# Patient Record
Sex: FEMALE | Race: WHITE | Employment: UNEMPLOYED | URBAN - NONMETROPOLITAN AREA
[De-identification: names, ages, dates, MRNs, and addresses within clinical notes are randomized per-mention and may not be internally consistent; named-entity substitution may affect disease eponyms.]

---

## 2019-02-14 ENCOUNTER — HOSPITAL ENCOUNTER (OUTPATIENT)
Age: 54
Discharge: HOME | End: 2019-02-14
Payer: MEDICAID

## 2019-02-14 LAB — INR BLD: 0.91 (ref 0.85–1.13)

## 2019-02-14 PROCEDURE — 36415 COLL VENOUS BLD VENIPUNCTURE: CPT

## 2019-02-14 PROCEDURE — 85610 PROTHROMBIN TIME: CPT

## 2019-04-02 ENCOUNTER — HOSPITAL ENCOUNTER (OUTPATIENT)
Dept: MRI IMAGING | Age: 54
Discharge: HOME | End: 2019-04-02
Payer: MEDICAID

## 2019-04-02 ENCOUNTER — HOSPITAL ENCOUNTER (OUTPATIENT)
Dept: GENERAL RADIOLOGY | Age: 54
Discharge: HOME | End: 2019-04-02
Payer: MEDICAID

## 2019-04-02 DIAGNOSIS — Z00.6 EXAMINATION FOR NORMAL COMPARISON FOR CLINICAL RESEARCH: ICD-10-CM

## 2019-04-02 PROCEDURE — 3209999900 MRI COMPARISON OF OUTSIDE FILMS

## 2019-04-02 PROCEDURE — 3209999900 XR COMPARISON OF OUTSIDE FILMS

## 2019-04-03 ENCOUNTER — OFFICE VISIT (OUTPATIENT)
Dept: NEUROSURGERY | Age: 54
End: 2019-04-03
Payer: MEDICAID

## 2019-04-03 ENCOUNTER — HOSPITAL ENCOUNTER (OUTPATIENT)
Dept: MRI IMAGING | Age: 54
Discharge: HOME | End: 2019-04-03
Payer: MEDICAID

## 2019-04-03 VITALS
DIASTOLIC BLOOD PRESSURE: 79 MMHG | BODY MASS INDEX: 22.88 KG/M2 | WEIGHT: 134 LBS | HEIGHT: 64 IN | HEART RATE: 66 BPM | SYSTOLIC BLOOD PRESSURE: 121 MMHG

## 2019-04-03 DIAGNOSIS — H53.9 VISION DISORDER: ICD-10-CM

## 2019-04-03 DIAGNOSIS — Z00.6 EXAMINATION FOR NORMAL COMPARISON FOR CLINICAL RESEARCH: ICD-10-CM

## 2019-04-03 DIAGNOSIS — R20.0 NUMBNESS: ICD-10-CM

## 2019-04-03 DIAGNOSIS — M47.812 SPONDYLOSIS OF CERVICAL REGION WITHOUT MYELOPATHY OR RADICULOPATHY: Primary | ICD-10-CM

## 2019-04-03 DIAGNOSIS — G35 MULTIPLE SCLEROSIS (HCC): ICD-10-CM

## 2019-04-03 PROCEDURE — G8427 DOCREV CUR MEDS BY ELIG CLIN: HCPCS | Performed by: NEUROLOGICAL SURGERY

## 2019-04-03 PROCEDURE — 3209999900 MRI COMPARISON OF OUTSIDE FILMS

## 2019-04-03 PROCEDURE — G8420 CALC BMI NORM PARAMETERS: HCPCS | Performed by: NEUROLOGICAL SURGERY

## 2019-04-03 PROCEDURE — 99203 OFFICE O/P NEW LOW 30 MIN: CPT | Performed by: NEUROLOGICAL SURGERY

## 2019-04-03 PROCEDURE — 4004F PT TOBACCO SCREEN RCVD TLK: CPT | Performed by: NEUROLOGICAL SURGERY

## 2019-04-03 PROCEDURE — 3017F COLORECTAL CA SCREEN DOC REV: CPT | Performed by: NEUROLOGICAL SURGERY

## 2019-04-03 RX ORDER — LEVOTHYROXINE SODIUM 137 UG/1
137 TABLET ORAL DAILY
COMMUNITY
Start: 2019-03-15

## 2019-04-03 ASSESSMENT — ENCOUNTER SYMPTOMS
ABDOMINAL PAIN: 0
CHEST TIGHTNESS: 0
BACK PAIN: 1

## 2019-04-03 NOTE — PROGRESS NOTES
[facility] PROFESSIONAL SERVS  [address] [place] [address] [address] [address]  Dept: [phone]  Dept Fax: [phone]      Patient Name:  Van Garibay  Visit Date:  4/3/2019    HPI:     Ms. Amena Jorgensen is a 47 y.o. female that presents today at [facility] for evaluation of the following:      Chief Complaint   Patient presents with    Consultation     Abnormal MRI        HPI     This is a 47year old female with past medical history significant for MS. Patient has a history of decline in her vision over the 3-4 over the last 3-4 years. Patient was not able to pass the vision test over the driving license recently because of peripheral referral visual field deficit. Patient also complained from balance issues and she had a couple of falls recently  She has numbness in the tips of her toes and fingers. Patient denied any focal weakness ( but she feels general weakness). Patient stated patient denied any change in her urination or bowel control. She denied any radicular type pain. However she stated that she has an electrical shock sensation that goes through her body when she flexes her neck. Patient underwent C and T spines MRI that showed: For this reason( her cervical spine degenerative disease at multiple levels), patient was referred to neurosurgery. Medications:    Current Outpatient Medications:     levothyroxine (SYNTHROID) 137 MCG tablet, Take 137 mcg by mouth daily, Disp: , Rfl:     Multiple Vitamins-Iron (MULTIVITAMIN/IRON PO), Take by mouth daily, Disp: , Rfl:     The patient is allergic to clonazepam and metronidazole. Past Medical History  [place]  has no past medical history on file. Past Surgical History  The patient  has no past surgical history on file. Family History  This patient's family history is not on file.     Social History  Ana      Subjective:      Review of Systems   Constitutional: Positive for activity change. Negative for fever. HENT: Negative for tinnitus. Eyes: Positive for visual disturbance. Respiratory: Negative for chest tightness. Cardiovascular: Negative for chest pain. Gastrointestinal: Negative for abdominal pain. Genitourinary: Negative for difficulty urinating. Musculoskeletal: Positive for back pain, gait problem and neck pain. Neurological: Positive for dizziness and numbness. Negative for weakness and headaches. Psychiatric/Behavioral: The patient is nervous/anxious. Objective:     Ht 5' 4\" (1.626 m)   Wt 134 lb (60.8 kg)   BMI 23.00 kg/m²      Examination of carotid arteries (puls, amplitude, bruits) or Examination of peripheral vascular system  (swelling, varicosities and pulses, temperature, edema,tenderness) : WNL  Patient is A/A/Ox3  Recent and remote memory: decline  Attention span and concentration: decline  Language (naming objects;repeating phrases;spontaneous speech): WNL  Fund of knowledge:  Good  Cranial nerves:2-12: left homonymous hemianopsia otherwise grossly inatct  Muscle strength: 5/5 through out  DTR in all 4 extremities:2+  Babinski: down response. Gait: slightly spastic with slight balance issue. Cerebellar function:slight ataxia . Sensation:Grossly intact including the vibration sensation  Straight leg raising test:Negative  Romberg's sign: negative. Limitation in range of motions in her C-spine. Reviewed MRI Type:  Film and Report    Lab Results   Component Value Date    INR 0.91 02/14/2019       Assessment and Plan      Diagnosis Orders   1. Spondylosis of cervical region without myelopathy or radiculopathy     2. Numbness     3. Multiple sclerosis ([address]     4. Vision disorder         - Patient's overall clinical picture are more consistent with MS. Based on her C-spine MRI findings and giving her over all clinical picture, I do not see a strong indication for any neurosurgical intervention at this time.   My recommendation for

## 2019-04-03 NOTE — LETTER
[address] [place]  Phone: [phone]  Fax: [phone]    Elyse ChaidezDO        April 3, 2019       Patient: Nathaly Rivera   MR Number: [id number]   YOB: 1965   Date of Visit: 4/3/2019       Dear Dr. Shannon Turcios: Thank you for the request for consultation for Britney Greer to me for the evaluation of cervical spine degenerative disease. Below are the relevant portions of my assessment and plan of care. Assessment:     HPI:    This is a 47year old female with past medical history significant for MS. Patient has a history of decline in her vision over the 3-4 over the last 3-4 years. Patient was not able to pass the vision test over the driving license recently because of peripheral referral visual field deficit. Patient also complained from balance issues and she had a couple of falls recently  She has numbness in the tips of her toes and fingers. Patient denied any focal weakness ( but she feels general weakness). Patient stated patient denied any change in her urination or bowel control. She denied any radicular type pain. However she stated that she has an electrical shock sensation that goes through her body when she flexes her neck. Patient underwent C and T spines MRI that showed: For this reason( her cervical spine degenerative disease at multiple levels), patient was referred to neurosurgery. On physical exam:       Examination of carotid arteries (puls, amplitude, bruits) or Examination of peripheral vascular system  (swelling, varicosities and pulses, temperature, edema,tenderness) :  WNL  Patient is A/A/Ox3  Recent and remote memory: decline  Attention span and concentration: decline  Language (naming objects;repeating phrases;spontaneous speech): WNL  Fund of knowledge:  Good  Cranial nerves:2-12: left homonymous hemianopsia otherwise grossly inatct Muscle strength: 5/5 through out  DTR in all 4 extremities:2+  Babinski: down response. Gait: slightly spastic with slight balance issue. Cerebellar function:slight ataxia . Sensation:Grossly intact including the vibration sensation  Straight leg raising test:Negative  Romberg's sign: negative. Limitation in range of motions in her C-spine. Reviewed MRI Type:  Film and Report    Lab Results   Component Value Date    INR 0.91 02/14/2019       Assessment and Plan      Diagnosis Orders   1. Spondylosis of cervical region without myelopathy or radiculopathy     2. Numbness     3. Multiple sclerosis ([address]     4. Vision disorder         - Patient's overall clinical picture are more consistent with MS. Based on her C-spine MRI findings and giving her over all clinical picture, I do not see a strong indication for any neurosurgical intervention at this time. My recommendation for patient, at this time is to continue follow up with her neurolgist and to follow-up with her  ophthalmologist.  Patient also may benefit from referral to physical/ rehabilitation medicine and pain medicine. - I may request  for patient C-spine CT for further evaluation of her C-pine bone structures based how her symptoms develop in the future( to make sure nothing significant is missed). - I discussed the case with Dr. Katya Ramos over the phone and she is in agreement. - I discussed with the patient her condition in detail and explained to her what I believe is the main source of her neurological symptoms.    - All questions and concerns were answered and addressed. - I will see patient again in couple of weeks for re-evaluation.  - Patient was in agreement with the above treatment plan. If you have questions, please do not hesitate to call me. I look forward to following Murtaza Lama along with you.     Sincerely,        Maria Victoria Grover MD    CC providers:  Nidhi Tamez DO  [address] [place] [address] [facility]  [address]  [address]